# Patient Record
Sex: MALE | Race: WHITE | NOT HISPANIC OR LATINO | Employment: FULL TIME | RURAL
[De-identification: names, ages, dates, MRNs, and addresses within clinical notes are randomized per-mention and may not be internally consistent; named-entity substitution may affect disease eponyms.]

---

## 2018-11-27 ENCOUNTER — OFFICE VISIT (OUTPATIENT)
Dept: FAMILY MEDICINE CLINIC | Facility: CLINIC | Age: 38
End: 2018-11-27

## 2018-11-27 VITALS
OXYGEN SATURATION: 98 % | BODY MASS INDEX: 30.88 KG/M2 | TEMPERATURE: 97.8 F | WEIGHT: 228 LBS | DIASTOLIC BLOOD PRESSURE: 96 MMHG | HEART RATE: 81 BPM | SYSTOLIC BLOOD PRESSURE: 140 MMHG | HEIGHT: 72 IN

## 2018-11-27 DIAGNOSIS — E78.5 HYPERLIPIDEMIA, UNSPECIFIED HYPERLIPIDEMIA TYPE: ICD-10-CM

## 2018-11-27 DIAGNOSIS — R73.01 ELEVATED FASTING GLUCOSE: ICD-10-CM

## 2018-11-27 DIAGNOSIS — Z20.2 EXPOSURE TO STD: ICD-10-CM

## 2018-11-27 DIAGNOSIS — R94.6 ABNORMAL THYROID FUNCTION TEST: ICD-10-CM

## 2018-11-27 DIAGNOSIS — R03.0 ELEVATED BLOOD PRESSURE READING: ICD-10-CM

## 2018-11-27 PROBLEM — Z86.19 HISTORY OF TICK-BORNE DISEASE: Status: ACTIVE | Noted: 2018-11-27

## 2018-11-27 PROBLEM — R79.0 ABNORMAL BLOOD LEVEL OF IRON: Status: ACTIVE | Noted: 2018-11-27

## 2018-11-27 PROBLEM — Z01.89 LABORATORY TEST: Status: ACTIVE | Noted: 2018-11-27

## 2018-11-27 PROBLEM — Z00.00 WELLNESS EXAMINATION: Status: ACTIVE | Noted: 2018-11-27

## 2018-11-27 LAB
ALBUMIN SERPL-MCNC: 4.6 G/DL (ref 3.5–5)
ALBUMIN/GLOB SERPL: 1.5 G/DL (ref 1.1–2.5)
ALP SERPL-CCNC: 59 U/L (ref 24–120)
ALT SERPL-CCNC: 75 U/L (ref 0–54)
AST SERPL-CCNC: 34 U/L (ref 7–45)
BASOPHILS # BLD AUTO: 0.05 10*3/MM3 (ref 0–0.2)
BASOPHILS NFR BLD AUTO: 0.5 % (ref 0–2)
BILIRUB SERPL-MCNC: 0.8 MG/DL (ref 0.1–1)
BUN SERPL-MCNC: 14 MG/DL (ref 5–21)
BUN/CREAT SERPL: 13.1 (ref 7–25)
CALCIUM SERPL-MCNC: 9 MG/DL (ref 8.4–10.4)
CHLORIDE SERPL-SCNC: 98 MMOL/L (ref 98–110)
CHOLEST SERPL-MCNC: 195 MG/DL (ref 130–200)
CO2 SERPL-SCNC: 29 MMOL/L (ref 24–31)
CREAT SERPL-MCNC: 1.07 MG/DL (ref 0.5–1.4)
EOSINOPHIL # BLD AUTO: 0 10*3/MM3 (ref 0–0.7)
EOSINOPHIL NFR BLD AUTO: 0 % (ref 0–4)
ERYTHROCYTE [DISTWIDTH] IN BLOOD BY AUTOMATED COUNT: 13.8 % (ref 12–15)
GLOBULIN SER CALC-MCNC: 3 GM/DL
GLUCOSE SERPL-MCNC: 117 MG/DL (ref 70–100)
HBA1C MFR BLD: 5.4 %
HCT VFR BLD AUTO: 51.6 % (ref 40–52)
HDLC SERPL-MCNC: 44 MG/DL
HGB BLD-MCNC: 17.3 G/DL (ref 14–18)
IMM GRANULOCYTES # BLD: 0.02 10*3/MM3 (ref 0–0.03)
IMM GRANULOCYTES NFR BLD: 0.2 % (ref 0–5)
LDLC SERPL CALC-MCNC: 126 MG/DL (ref 0–99)
LYMPHOCYTES # BLD AUTO: 1.49 10*3/MM3 (ref 0.72–4.86)
LYMPHOCYTES NFR BLD AUTO: 14.5 % (ref 15–45)
MCH RBC QN AUTO: 29 PG (ref 28–32)
MCHC RBC AUTO-ENTMCNC: 33.5 G/DL (ref 33–36)
MCV RBC AUTO: 86.4 FL (ref 82–95)
MONOCYTES # BLD AUTO: 0.65 10*3/MM3 (ref 0.19–1.3)
MONOCYTES NFR BLD AUTO: 6.3 % (ref 4–12)
NEUTROPHILS # BLD AUTO: 8.06 10*3/MM3 (ref 1.87–8.4)
NEUTROPHILS NFR BLD AUTO: 78.5 % (ref 39–78)
NRBC BLD AUTO-RTO: 0 /100 WBC (ref 0–0)
PLATELET # BLD AUTO: 292 10*3/MM3 (ref 130–400)
POTASSIUM SERPL-SCNC: 4.3 MMOL/L (ref 3.5–5.3)
PROT SERPL-MCNC: 7.6 G/DL (ref 6.3–8.7)
RBC # BLD AUTO: 5.97 10*6/MM3 (ref 4.8–5.9)
SODIUM SERPL-SCNC: 140 MMOL/L (ref 135–145)
TRIGL SERPL-MCNC: 125 MG/DL (ref 0–149)
TSH SERPL DL<=0.005 MIU/L-ACNC: 0.49 MIU/ML (ref 0.47–4.68)
VLDLC SERPL CALC-MCNC: 25 MG/DL
WBC # BLD AUTO: 10.27 10*3/MM3 (ref 4.8–10.8)

## 2018-11-27 PROCEDURE — 99214 OFFICE O/P EST MOD 30 MIN: CPT | Performed by: FAMILY MEDICINE

## 2018-11-27 NOTE — PROGRESS NOTES
Subjective   Mode Florez is a 38 y.o. male presenting with chief complaint of:   Chief Complaint   Patient presents with   • Annual Exam     Check-up.       History of Present Illness :  Alone.   Has multiple chronic problems to consider that might have a bearing on today's issues;  an interval appointment.       Chronic/acute problems reviewed today:   1. Elevated blood pressure reading: acute/without pattern and uncertain if has HTN/not.  No chest pain shortness of breath.   2. Abnormal thyroid function test: chronic/stable past elevations and no clinical associations yet. Lab monitored.    3. Elevated fasting glucose: Chronic/stable. No problem/pattern hypoglycemia/hyperglycemia manifest by poly- dypsia, phagia, uria, or sweats, diaphoretic episodes, syncope/near.      4. Hyperlipidemia, unspecified hyperlipidemia type: Chronic/stable.  Tolerated use of statin with labs showing improved lipid values and tolerant liver labs. No muscle aches unexpected.      5. Exposure to STD: chronic/occ ? Of exposure though not certain. No signs/symptoms just likes to have occ labs.       Has an/another acute issue today: none.    The following portions of the patient's history were reviewed and updated as appropriate: allergies, current medications, past family history, past medical history, past social history, past surgical history and problem list.      Current Outpatient Medications:   •  Multiple Vitamins-Minerals (MULTIVITAMIN ADULT PO), Take 1 tablet by mouth Daily., Disp: , Rfl:     No problems with medications.  Refills if needed done    No Known Allergies    Review of Systems  GENERAL:  Active home/work. Sleep is ok; apnea denied. No fever now.  SKIN: No  rash/skin lesion of concern:   ENDO:  No syncope, near or diaphoretic sweaty spells.  HEENT: No recent head injury; or headache,  No vision change.  No hearing loss.  Ears without pain/drainage.  No sore throat.  No significant nasal/sinus congestion/drainage. No  "epistaxis.  CHEST: No chest wall tenderness or mass. No cough,  without wheeze.  No SOB; no hemoptysis.  CV: No exertional chest pain, palpitations, ankle edema.  GI: No heartburn, dysphagia.  No abdominal pain, diarrhea, constipation.  No rectal bleeding, or melena.    :  Voids without dysuria, or  incontinence to completion.  ORTHO: No painful/swollen joints but various on /off sore.  No sore neck or back.  No acute neck or back pain without recent injury.  NEURO: No dizziness, weakness of extremities.  No numbness/paresthesias.   PSYCH: No memory loss.  Mood good; not anxious, depressed but/and not suicidal.  Tries to tolerate stress .   Screening:  Mammogram: NA  Bone density: NA  Low dose CT chest: NA  GI: NA  Prostate: NA  Usual lab order  6m BMP, A1c, iron  12m CBC, CMP, A1c, LIPID, TSH, iron    No results found for this or any previous visit.    No results found for: PSA     Lab Results:  CBC:No results for input(s): WBC, HGB, HCT, PLT, IRONSERUM, IRON in the last 37107 hours.   BMP/CMP:No results for input(s): NA, K, CL, CO2, GLU, BUN, CREATININE, EGFRIFNONA, EGFRIFAFRI, CALCIUM in the last 18062 hours.  HEPATIC:No results for input(s): ALT, AST, ALKPHOS, TOTAL in the last 68220 hours.  THYROID:No results for input(s): TSH, T3FREE, FTI in the last 23744 hours.    Invalid input(s): T4FREE, T3, T4, TEUP,  TOTALT4  A1C:No results for input(s): HGBA1C in the last 19164 hours.  PSA:No results for input(s): PSA in the last 35952 hours.    Objective   /96 (BP Location: Left arm, Patient Position: Sitting)   Pulse 81   Temp 97.8 °F (36.6 °C) (Tympanic)   Ht 182.9 cm (72\")   Wt 103 kg (228 lb)   SpO2 98%   BMI 30.92 kg/m²   Body mass index is 30.92 kg/m².    Physical Exam  GENERAL:  Well nourished/developed in no acute distress.   SKIN: Turgor excellent, without wound, rash, lesion.  HEENT: Normal cephalic without trauma.  Pupils equal round reactive to light. Extraocular motions full without " nystagmus.   External canals nonobstructive nontender without reddness. Tymphatic membranes amber with michelle structures intact.   Oral cavity without growths, exudates, and moist.  Posterior pharynx without mass, obstruction, redness.  No thyromegaly, mass, tenderness, lymphadenopathy and supple.  CV: Regular rhythm.  No murmur, gallop,  edema. Posterior pulses intact.  No carotid bruits.  CHEST: No chest wall tenderness or mass.   LUNGS: Symmetric motion with clear to auscultation.   ABD: Soft, nontender without mass.   ORTHO: Symmetric extremities without swelling/point tenderness.  Full gross range of motion.  NEURO: CN 2-12 grossly intact.  Symmetric facies and UE/LE. 4/5 strength throughout. 1/4 x bicep equal reflexes.  Nonfocal use extremities. Speech clear.  Intact light touch with monofilament, vibratory sensation with tuning fork; equal toes/distal feet.    PSYCH: Oriented x 3.  Pleasant calm, well kept.  Purposeful/directed conservation with intact short/long gross memory.     Assessment/Plan     1. Elevated blood pressure reading    2. Abnormal thyroid function test    3. Elevated fasting glucose    4. Hyperlipidemia, unspecified hyperlipidemia type    5. Exposure to STD        Rx: reviewed/changes:  none    LAB/Testing/Referrals: reviewed/orders:   Today:   Orders Placed This Encounter   Procedures   • Chlamydia trachomatis, Neisseria gonorrhoeae, PCR - Swab, Urine, Clean Catch   • TSH   • Comprehensive Metabolic Panel   • Hemoglobin A1c   • Lipid Panel   • RPR   • HIV-1/O/2 Ag/Ab w Reflex   • CBC & Differential       Discussions:   Safe sex  Body mass index is 30.92 kg/m².   Patient's Body mass index is 30.92 kg/m². BMI is within normal parameters. No follow-up required.  Non-smoker    Patient Instructions   Goal for your blood pressure is 130 range top and 80 range bottom and you feeling better. Use us/or home monitoring to prove this.         Follow up: Return for lab today and lab 6m and   Francisco/lab 12m.  Future Appointments   Date Time Provider Department Center   11/28/2018  9:30 AM [facility] [place] None   5/31/2019 10:00 AM [facility] [place] None   12/2/2019 10:30 AM Yfn Singh MD [facility] None

## 2018-11-28 ENCOUNTER — RESULTS ENCOUNTER (OUTPATIENT)
Dept: FAMILY MEDICINE CLINIC | Facility: CLINIC | Age: 38
End: 2018-11-28

## 2018-11-28 DIAGNOSIS — Z20.2 EXPOSURE TO STD: ICD-10-CM

## 2018-11-28 DIAGNOSIS — Z20.2 EXPOSURE TO STD: Primary | ICD-10-CM

## 2018-11-29 LAB
C TRACH RRNA SPEC QL NAA+PROBE: NEGATIVE
N GONORRHOEA RRNA SPEC QL NAA+PROBE: NEGATIVE

## 2018-12-03 ENCOUNTER — RESULTS ENCOUNTER (OUTPATIENT)
Dept: FAMILY MEDICINE CLINIC | Facility: CLINIC | Age: 38
End: 2018-12-03

## 2018-12-03 DIAGNOSIS — Z20.2 EXPOSURE TO STD: ICD-10-CM

## 2019-06-03 DIAGNOSIS — R73.01 ELEVATED FASTING GLUCOSE: ICD-10-CM

## 2019-06-03 DIAGNOSIS — R94.6 ABNORMAL THYROID FUNCTION TEST: Primary | ICD-10-CM

## 2019-06-04 LAB
BUN SERPL-MCNC: 13 MG/DL (ref 6–20)
BUN/CREAT SERPL: 13.4 (ref 7–25)
CALCIUM SERPL-MCNC: 9.2 MG/DL (ref 8.6–10.5)
CHLORIDE SERPL-SCNC: 102 MMOL/L (ref 98–107)
CO2 SERPL-SCNC: 27.8 MMOL/L (ref 22–29)
CREAT SERPL-MCNC: 0.97 MG/DL (ref 0.76–1.27)
GLUCOSE SERPL-MCNC: 117 MG/DL (ref 65–99)
HBA1C MFR BLD: 5.7 % (ref 4.8–5.6)
IRON SERPL-MCNC: 119 MCG/DL (ref 59–158)
POTASSIUM SERPL-SCNC: 4 MMOL/L (ref 3.5–5.2)
SODIUM SERPL-SCNC: 142 MMOL/L (ref 136–145)

## 2021-08-10 ENCOUNTER — TELEMEDICINE (OUTPATIENT)
Dept: FAMILY MEDICINE CLINIC | Facility: CLINIC | Age: 41
End: 2021-08-10

## 2021-08-10 ENCOUNTER — TELEPHONE (OUTPATIENT)
Dept: FAMILY MEDICINE CLINIC | Facility: CLINIC | Age: 41
End: 2021-08-10

## 2021-08-10 DIAGNOSIS — U07.1 COVID-19: Primary | ICD-10-CM

## 2021-08-10 PROCEDURE — 99213 OFFICE O/P EST LOW 20 MIN: CPT | Performed by: NURSE PRACTITIONER

## 2021-08-10 RX ORDER — MELATONIN
1000 DAILY
Qty: 14 TABLET | Refills: 0 | Status: SHIPPED | OUTPATIENT
Start: 2021-08-10

## 2021-08-10 RX ORDER — FAMOTIDINE 20 MG/1
20 TABLET, FILM COATED ORAL 2 TIMES DAILY
Qty: 28 TABLET | Refills: 0 | Status: SHIPPED | OUTPATIENT
Start: 2021-08-10

## 2021-08-10 RX ORDER — ATORVASTATIN CALCIUM 40 MG/1
40 TABLET, FILM COATED ORAL NIGHTLY
Qty: 14 TABLET | Refills: 0 | Status: SHIPPED | OUTPATIENT
Start: 2021-08-10

## 2021-08-10 RX ORDER — ZINC SULFATE 50(220)MG
220 CAPSULE ORAL
Qty: 14 CAPSULE | Refills: 0 | Status: SHIPPED | OUTPATIENT
Start: 2021-08-10

## 2021-08-10 NOTE — PROGRESS NOTES
Subjective   Chief Complaint:  Evaluation of COVID-19    History of Present Illness:  This 41 y.o. male was seen via telemedicine MyChart video conference today for evaluation of COVID-19.  He reports he is unvaccinated.  He reports he was tested for Covid and was positive as of yesterday.  Reports symptoms starting same day.    No Known Allergies   Current Outpatient Medications on File Prior to Visit   Medication Sig   • Multiple Vitamins-Minerals (MULTIVITAMIN ADULT PO) Take 1 tablet by mouth Daily.     No current facility-administered medications on file prior to visit.      Past Medical, Surgical, Social, and Family History:  History reviewed. No pertinent past medical history.  History reviewed. No pertinent surgical history.  Social History     Socioeconomic History   • Marital status: Single     Spouse name: Not on file   • Number of children: Not on file   • Years of education: Not on file   • Highest education level: Not on file   Tobacco Use   • Smoking status: Never Smoker   • Smokeless tobacco: Never Used   Substance and Sexual Activity   • Alcohol use: No   • Drug use: No   • Sexual activity: Defer     Family History   Problem Relation Age of Onset   • No Known Problems Mother    • Diabetes Father      Objective   Physical Exam  Constitutional:       General: He is not in acute distress.     Appearance: Normal appearance. He is ill-appearing.   Pulmonary:      Effort: Pulmonary effort is normal. No respiratory distress.   Neurological:      Mental Status: He is alert.       Assessment/Plan   Diagnoses and all orders for this visit:    1. COVID-19 (Primary)    Other orders  -     atorvastatin (Lipitor) 40 MG tablet; Take 1 tablet by mouth Every Night.  Dispense: 14 tablet; Refill: 0  -     famotidine (Pepcid) 20 MG tablet; Take 1 tablet by mouth 2 (Two) Times a Day.  Dispense: 28 tablet; Refill: 0  -     cholecalciferol (Vitamin D, Cholecalciferol,) 25 MCG (1000 UT) tablet; Take 1 tablet by mouth Daily.   Dispense: 14 tablet; Refill: 0  -     Ascorbic Acid (Vitamin C) 500 MG chewable tablet; Chew 1,000 mg Daily for 14 days.  Dispense: 28 each; Refill: 0  -     Zinc 220 (50 Zn) MG capsule; Take 1 capsule by mouth Daily With Breakfast.  Dispense: 14 capsule; Refill: 0    Discussion:  Advised and educated plan of care.  Covid cocktail sent, advised quarantine guidelines, advised ED guidelines.    Follow-up:  Return if symptoms worsen or fail to improve.    Electronically signed by REY Lucas, 08/10/21, 3:22 PM CDT.

## 2021-08-10 NOTE — TELEPHONE ENCOUNTER
Caller: Mode Florez    Relationship: Self    Best call back number: [phone]    What medication are you requesting: SOMETHING TO HELP SYMPTOMS.    What are your current symptoms:  COUGH, CHEST CONGESTION, BODY ACHES, ELEVATED TEMPERATURE AND DIZZINESS     How long have you been experiencing symptoms: COUPLE OF DAYS     Have you had these symptoms before:    [x] Yes  [] No    Have you been treated for these symptoms before:   [x] Yes  [] No    If a prescription is needed, what is your preferred pharmacy and phone number: [facility] / [place], [place] - [address] [facility] - [phone] [facility] [phone] [facility]     Additional notes: REQUESTING CALLBACK IF/WHEN SOMETHING IS SENT IN. PATIENT IS COVID-19 POSITIVE.

## 2021-08-10 NOTE — TELEPHONE ENCOUNTER
Please schedule a telehealth appointment this afternoon.    Electronically signed by REY Lucas, 08/10/21, 1:05 PM CDT.

## 2021-08-17 ENCOUNTER — TELEPHONE (OUTPATIENT)
Dept: FAMILY MEDICINE CLINIC | Facility: CLINIC | Age: 41
End: 2021-08-17

## 2021-08-17 DIAGNOSIS — R73.01 ELEVATED FASTING GLUCOSE: Primary | ICD-10-CM

## 2021-08-17 DIAGNOSIS — R94.6 ABNORMAL THYROID FUNCTION TEST: ICD-10-CM

## 2021-08-17 DIAGNOSIS — R79.0 ABNORMAL BLOOD LEVEL OF IRON: ICD-10-CM

## 2021-08-17 DIAGNOSIS — Z86.19 HISTORY OF TICK-BORNE DISEASE: ICD-10-CM

## 2021-08-17 DIAGNOSIS — E78.2 MIXED HYPERLIPIDEMIA: ICD-10-CM

## 2021-08-17 DIAGNOSIS — Z00.00 WELLNESS EXAMINATION: ICD-10-CM

## 2021-08-17 NOTE — TELEPHONE ENCOUNTER
Caller: Mode Florez    Relationship: Self    Best call back number: [phone]    What form or medical record are you requesting:     Workmans Comp Paperwork for COVID    Who is requesting this form or medical record from you:   Employer    How would you like to receive the form or medical records (pick-up, mail, fax):     Email    Timeframe paperwork needed:     ASAP

## 2021-08-18 NOTE — TELEPHONE ENCOUNTER
His employer will have to send us the paperwork.  I do not believe we have anything here for him for this.

## 2021-08-18 NOTE — TELEPHONE ENCOUNTER
Notified pt and will fax or my chart his forms to be filled out and advised is past due for annual fasting labs and stated will scheduled when he is feeling better, orders place

## 2024-04-04 ENCOUNTER — OFFICE VISIT (OUTPATIENT)
Dept: FAMILY MEDICINE CLINIC | Facility: CLINIC | Age: 44
End: 2024-04-04
Payer: COMMERCIAL

## 2024-04-04 VITALS
RESPIRATION RATE: 18 BRPM | HEIGHT: 72 IN | OXYGEN SATURATION: 98 % | TEMPERATURE: 97.1 F | HEART RATE: 79 BPM | BODY MASS INDEX: 31.97 KG/M2 | DIASTOLIC BLOOD PRESSURE: 84 MMHG | SYSTOLIC BLOOD PRESSURE: 126 MMHG | WEIGHT: 236 LBS

## 2024-04-04 DIAGNOSIS — R73.01 ELEVATED FASTING GLUCOSE: ICD-10-CM

## 2024-04-04 DIAGNOSIS — G47.33 OSA (OBSTRUCTIVE SLEEP APNEA): ICD-10-CM

## 2024-04-04 DIAGNOSIS — R94.6 ABNORMAL THYROID FUNCTION TEST: ICD-10-CM

## 2024-04-04 DIAGNOSIS — E78.2 MIXED HYPERLIPIDEMIA: ICD-10-CM

## 2024-04-04 DIAGNOSIS — R79.0 ABNORMAL BLOOD LEVEL OF IRON: ICD-10-CM

## 2024-04-04 NOTE — PROGRESS NOTES
B[facility]”.   Subjective   Mode Florez is a 43 y.o. male presenting with chief complaint of:   Chief Complaint   Patient presents with    Annual Exam     AWV NA  Last Completed Annual Wellness Visit       This patient has no relevant Health Maintenance data.             History of Present Illness :  Alone.   Here for review of chronic problems that includes elevated fasting glucose past and others.  Annual visit also    Has few chronic problems to consider that might have a bearing on today's issues; not an interval appointment.       Chronic/acute problems reviewed today:   1. Elevated fasting glucose Chronic/stable. No problem/pattern hypoglycemia/hyperglycemia manifest by poly- dypsia, phagia, uria, or sweats, diaphoretic episodes, syncope/near.      2. Abnormal thyroid function test chronic/past history abnormal; lost to f/u.  Energy level ok without hair/skin change.    3. Abnormal blood level of iron history of iron abnormal; high past then returned to normal   4. Mixed hyperlipidemia Chronic/stable: Once given Lipitor 40 and has been lost to follow-up   5. BALTAZAR (obstructive sleep apnea) chronic increasing difficulty with being tired during the day not to the point of accident.  Others have noted he quits breathing when he sleeps.     Has an/another acute issue today: none.    The following portions of the patient's history were reviewed and updated as appropriate: allergies, current medications, past family history, past medical history, past social history, past surgical history, and problem list.      Current Outpatient Medications:     Multiple Vitamins-Minerals (MULTIVITAMIN ADULT PO), Take 1 tablet by mouth Daily., Disp: , Rfl:     Zinc 220 (50 Zn) MG capsule, Take 1 capsule by mouth Daily With Breakfast., Disp: 14 capsule, Rfl: 0    No problems with medications.    No Known Allergies    Review of Systems  GENERAL:  Active home/work. Sleep is ok; apnea denied. No fever now.  SKIN: No rash/skin lesion  of concern:   ENDO:  No syncope, near or diaphoretic sweaty spells.  HEENT: No recent head injury; or headache,  No vision change.  No hearing loss.  Ears without pain/drainage.  No sore throat.  No significant nasal/sinus congestion/drainage. No epistaxis.  CHEST: No chest wall tenderness or mass. No cough, without wheeze.  No SOB; no hemoptysis.  CV: No exertional chest pain, palpitations, ankle edema.  GI: No heartburn, dysphagia.  No abdominal pain, diarrhea, constipation.  No rectal bleeding, or melena.    :  Voids without dysuria, or incontinence to completion.  ORTHO: No painful/swollen joints but various on /off sore.  No sore neck or back.  No acute neck or back pain without recent injury.  NEURO: No dizziness, weakness of extremities.  No numbness/paresthesias.   PSYCH: No memory loss.  Mood good; not anxious, depressed but/and not suicidal.  Tries to tolerate stress .   Screening:  Mammogram: NA  Bone density: NA  Low dose CT chest: NA  GI: NA  Prostate: NA  Usual lab order  6m BMP, A1c, iron  12m CBC, CMP, A1c, LIPID, TSH, iron    Copy/paste function used for ROS/exam AND each area of these were reviewed, updated, confirmed and supplemented as needed.  Data reviewed:   Recent admit/ER/MD visits: 11.27.18    1. Elevated blood pressure reading    2. Abnormal thyroid function test    3. Elevated fasting glucose    4. Hyperlipidemia, unspecified hyperlipidemia type    5. Exposure to STD          Rx: reviewed/changes:  none     LAB/Testing/Referrals: reviewed/orders:   Today:       Orders Placed This Encounter   Procedures    Chlamydia trachomatis, Neisseria gonorrhoeae, PCR - Swab, Urine, Clean Catch    TSH    Comprehensive Metabolic Panel    Hemoglobin A1c    Lipid Panel    RPR    HIV-1/O/2 Ag/Ab w Reflex    CBC & Differential         Discussions:   Safe sex  Body mass index is 30.92 kg/m².   Patient's Body mass index is 30.92 kg/m². BMI is within normal parameters. No follow-up  "required.  Non-smoker    Last cardiac testing:   Echo:     Radiology considered:   No radiology results for the last 90 days.    Lab Results:  Results for orders placed or performed in visit on 06/03/19   Basic Metabolic Panel    Specimen: Blood   Result Value Ref Range    Glucose 117 (H) 65 - 99 mg/dL    BUN 13 6 - 20 mg/dL    Creatinine 0.97 0.76 - 1.27 mg/dL    eGFR Non African Am 86 >60 mL/min/1.73    eGFR African Am 104 >60 mL/min/1.73    BUN/Creatinine Ratio 13.4 7.0 - 25.0    Sodium 142 136 - 145 mmol/L    Potassium 4.0 3.5 - 5.2 mmol/L    Chloride 102 98 - 107 mmol/L    Total CO2 27.8 22.0 - 29.0 mmol/L    Calcium 9.2 8.6 - 10.5 mg/dL   Hemoglobin A1c    Specimen: Blood   Result Value Ref Range    Hemoglobin A1C 5.70 (H) 4.80 - 5.60 %   Iron    Specimen: Blood   Result Value Ref Range    Iron 119 59 - 158 mcg/dL       Objective   /84   Pulse 79   Temp 97.1 °F (36.2 °C) (Temporal)   Resp 18   Ht 182.9 cm (72\")   Wt 107 kg (236 lb)   SpO2 98%   BMI 32.01 kg/m²   Body mass index is 32.01 kg/m².    Recent Vitals         11/27/2018 4/4/2024          BP: 140/96 126/84      Pulse: 81 79      Temp: 97.8 °F (36.6 °C) 97.1 °F (36.2 °C)      Weight: 103 kg (228 lb) 107 kg (236 lb)      BMI (Calculated): 30.9 32            Wt Readings from Last 15 Encounters:   04/04/24 1058 107 kg (236 lb)   11/27/18 0931 103 kg (228 lb)       Physical Exam      Assessment & Plan     1. Elevated fasting glucose    2. Abnormal thyroid function test    3. Abnormal blood level of iron    4. Mixed hyperlipidemia    5. BALTAZAR (obstructive sleep apnea)      Data review above:   Discussions/medical decisions/reviews:  BP ok  Other vitals ok  Recent Vitals         11/27/2018 4/4/2024          BP: 140/96 126/84      Pulse: 81 79      Temp: 97.8 °F (36.6 °C) 97.1 °F (36.2 °C)      Weight: 103 kg (228 lb) 107 kg (236 lb)      BMI (Calculated): 30.9 32            GENERAL:  Well nourished/developed in no acute distress.   SKIN: Turgor " excellent, without wound, rash, lesion.  HEENT: Normal cephalic without trauma.  Pupils equal round reactive to light. Extraocular motions full without nystagmus.   External canals nonobstructive nontender without reddness. Tymphatic membranes amber with michelle structures intact.   Oral cavity without growths, exudates, and moist.  Posterior pharynx without mass, obstruction, redness.  No thyromegaly, mass, tenderness, lymphadenopathy and supple.  CV: Regular rhythm.  No murmur, gallop,  edema. Posterior pulses intact.  No carotid bruits.  CHEST: No chest wall tenderness or mass.   LUNGS: Symmetric motion with clear to auscultation.   ABD: Soft, nontender without mass.   ORTHO: Symmetric extremities without swelling/point tenderness.  Full gross range of motion.  NEURO: CN 2-12 grossly intact.  Symmetric facies and UE/LE. 4/5 strength throughout. 1/4 x bicep equal reflexes.  Nonfocal use extremities. Speech clear.    PSYCH: Oriented x 3.  Pleasant calm, well kept.  Purposeful/directed conservation with intact short/long gross memory.     Wellness/or annual done   Screening reviewed/updated   Labs today  Sleep study advised  ? Need hemachromatosis testing-see what iron shows  Found when typing: ? HPV vaccine    Follow up: Return for lab today then will see .  No future appointments.    Data review above:   Rx: reviewed and decisions:   Rx new/changes: none  No orders of the defined types were placed in this encounter.      Orders placed:   LAB/Testing/Referrals: reviewed/orders:   Today:   Orders Placed This Encounter   Procedures    Comprehensive Metabolic Panel    Ferritin    Iron Profile    Hemoglobin A1c    TSH Rfx On Abnormal To Free T4    Home Sleep Study    CBC & Differential     Chronic/recurrent labs above or change to:   Same       There is no immunization history on file for this patient.  We advised/reaffirmed our support/suggestion for staying complete with covid- covid boosters, seasonal flu/yearly and  "any missing vaccine from list we supplied; when cannot be given here we suggest contact with local health department office or pharmacy to review missing/needed vaccines and then bring nursing documentation for these vaccines to this office or call this information in. Shingles became \"free\" 1.1.23 for medicare insurance.    Health maintenance:   Body mass index is 32.01 kg/m².  BMI is >= 30 and <35. (Class 1 Obesity). The following options were offered after discussion;: exercise counseling/recommendations and nutrition counseling/recommendations      Tobacco use reviewed:   Mode Florez  reports that he has never smoked. He has never used smokeless tobacco.    Annual/wellness also done today.  Issues as appropriate discussed as counseling, anticipatory guidance:   Nutrition, physical activity, healthy weight, injury prevention, misuse of tobacco, alcohol and drugs, sexual behavior and STDs, contraception, dental health, mental health, immunizations, screenings as appropriate. As appropriate see AVS.    There are no Patient Instructions on file for this visit.          "

## 2024-04-05 PROBLEM — E11.9 TYPE 2 DIABETES MELLITUS WITHOUT COMPLICATION, WITHOUT LONG-TERM CURRENT USE OF INSULIN: Status: ACTIVE | Noted: 2024-04-05

## 2024-04-05 LAB
ALBUMIN SERPL-MCNC: 4.8 G/DL (ref 3.5–5.2)
ALBUMIN/GLOB SERPL: 2 G/DL
ALP SERPL-CCNC: 68 U/L (ref 39–117)
ALT SERPL-CCNC: 63 U/L (ref 1–41)
AST SERPL-CCNC: 25 U/L (ref 1–40)
BASOPHILS # BLD AUTO: 0.04 10*3/MM3 (ref 0–0.2)
BASOPHILS NFR BLD AUTO: 0.6 % (ref 0–1.5)
BILIRUB SERPL-MCNC: 0.7 MG/DL (ref 0–1.2)
BUN SERPL-MCNC: 13 MG/DL (ref 6–20)
BUN/CREAT SERPL: 11.1 (ref 7–25)
CALCIUM SERPL-MCNC: 8.7 MG/DL (ref 8.6–10.5)
CHLORIDE SERPL-SCNC: 101 MMOL/L (ref 98–107)
CO2 SERPL-SCNC: 28.8 MMOL/L (ref 22–29)
CREAT SERPL-MCNC: 1.17 MG/DL (ref 0.76–1.27)
EGFRCR SERPLBLD CKD-EPI 2021: 79.3 ML/MIN/1.73
EOSINOPHIL # BLD AUTO: 0.12 10*3/MM3 (ref 0–0.4)
EOSINOPHIL NFR BLD AUTO: 1.9 % (ref 0.3–6.2)
ERYTHROCYTE [DISTWIDTH] IN BLOOD BY AUTOMATED COUNT: 14 % (ref 12.3–15.4)
FERRITIN SERPL-MCNC: 715 NG/ML (ref 30–400)
GLOBULIN SER CALC-MCNC: 2.4 GM/DL
GLUCOSE SERPL-MCNC: 128 MG/DL (ref 65–99)
HBA1C MFR BLD: 6.9 % (ref 4.8–5.6)
HCT VFR BLD AUTO: 48.3 % (ref 37.5–51)
HGB BLD-MCNC: 16.4 G/DL (ref 13–17.7)
IMM GRANULOCYTES # BLD AUTO: 0.02 10*3/MM3 (ref 0–0.05)
IMM GRANULOCYTES NFR BLD AUTO: 0.3 % (ref 0–0.5)
IRON SATN MFR SERPL: 30 % (ref 20–50)
IRON SERPL-MCNC: 114 MCG/DL (ref 59–158)
LYMPHOCYTES # BLD AUTO: 2.33 10*3/MM3 (ref 0.7–3.1)
LYMPHOCYTES NFR BLD AUTO: 37.8 % (ref 19.6–45.3)
MCH RBC QN AUTO: 27.7 PG (ref 26.6–33)
MCHC RBC AUTO-ENTMCNC: 34 G/DL (ref 31.5–35.7)
MCV RBC AUTO: 81.5 FL (ref 79–97)
MONOCYTES # BLD AUTO: 0.5 10*3/MM3 (ref 0.1–0.9)
MONOCYTES NFR BLD AUTO: 8.1 % (ref 5–12)
NEUTROPHILS # BLD AUTO: 3.15 10*3/MM3 (ref 1.7–7)
NEUTROPHILS NFR BLD AUTO: 51.3 % (ref 42.7–76)
NRBC BLD AUTO-RTO: 0 /100 WBC (ref 0–0.2)
PLATELET # BLD AUTO: 299 10*3/MM3 (ref 140–450)
POTASSIUM SERPL-SCNC: 4 MMOL/L (ref 3.5–5.2)
PROT SERPL-MCNC: 7.2 G/DL (ref 6–8.5)
RBC # BLD AUTO: 5.93 10*6/MM3 (ref 4.14–5.8)
SODIUM SERPL-SCNC: 140 MMOL/L (ref 136–145)
TIBC SERPL-MCNC: 375 MCG/DL
TSH SERPL DL<=0.005 MIU/L-ACNC: 0.9 UIU/ML (ref 0.27–4.2)
UIBC SERPL-MCNC: 261 MCG/DL (ref 112–346)
WBC # BLD AUTO: 6.16 10*3/MM3 (ref 3.4–10.8)

## 2024-04-08 RX ORDER — METFORMIN HYDROCHLORIDE 500 MG/1
500 TABLET, EXTENDED RELEASE ORAL NIGHTLY
Qty: 30 TABLET | Refills: 0 | Status: SHIPPED | OUTPATIENT
Start: 2024-04-08

## 2024-04-08 NOTE — TELEPHONE ENCOUNTER
----- Message from Bravo Ricardo MA sent at 4/8/2024 11:09 AM CDT -----  Would like medication be sent to [facility] in [place]

## 2024-04-08 NOTE — TELEPHONE ENCOUNTER
Start metformin 500 XR one HS  Return 1m-will discuss more and get him meter/test strips/etc    Go ahead and write/make apt  Send to me for approval

## 2024-05-07 ENCOUNTER — HOSPITAL ENCOUNTER (OUTPATIENT)
Dept: SLEEP CENTER | Age: 44
Discharge: HOME | End: 2024-05-09

## 2024-05-07 ENCOUNTER — OFFICE VISIT (OUTPATIENT)
Dept: FAMILY MEDICINE CLINIC | Facility: CLINIC | Age: 44
End: 2024-05-07
Payer: COMMERCIAL

## 2024-05-07 VITALS
TEMPERATURE: 97.3 F | OXYGEN SATURATION: 97 % | BODY MASS INDEX: 32.1 KG/M2 | SYSTOLIC BLOOD PRESSURE: 140 MMHG | HEIGHT: 72 IN | HEART RATE: 81 BPM | DIASTOLIC BLOOD PRESSURE: 84 MMHG | WEIGHT: 237 LBS

## 2024-05-07 DIAGNOSIS — R74.8 ELEVATED LIVER ENZYMES: ICD-10-CM

## 2024-05-07 DIAGNOSIS — Z11.59 NEED FOR HEPATITIS C SCREENING TEST: ICD-10-CM

## 2024-05-07 DIAGNOSIS — E11.9 TYPE 2 DIABETES MELLITUS WITHOUT COMPLICATION, WITHOUT LONG-TERM CURRENT USE OF INSULIN: ICD-10-CM

## 2024-05-07 DIAGNOSIS — G47.33 OSA (OBSTRUCTIVE SLEEP APNEA): ICD-10-CM

## 2024-05-07 DIAGNOSIS — B35.1 FUNGAL TOENAIL INFECTION: ICD-10-CM

## 2024-05-07 PROCEDURE — 99214 OFFICE O/P EST MOD 30 MIN: CPT | Performed by: FAMILY MEDICINE

## 2024-05-07 RX ORDER — EFINACONAZOLE 100 MG/ML
1 SOLUTION TOPICAL DAILY
Qty: 4 ML | Refills: 5 | Status: SHIPPED | OUTPATIENT
Start: 2024-05-07

## 2024-05-07 RX ORDER — BLOOD-GLUCOSE METER
1 EACH MISCELLANEOUS TAKE AS DIRECTED
Qty: 1 EACH | Refills: 0 | Status: SHIPPED | OUTPATIENT
Start: 2024-05-07

## 2024-05-07 RX ORDER — LANCETS 30 GAUGE
EACH MISCELLANEOUS
Qty: 100 EACH | Refills: 12 | Status: SHIPPED | OUTPATIENT
Start: 2024-05-07

## 2024-05-07 ASSESSMENT — SLEEP AND FATIGUE QUESTIONNAIRES
HOW LIKELY ARE YOU TO NOD OFF OR FALL ASLEEP WHILE SITTING AND READING: HIGH CHANCE OF DOZING
HOW LIKELY ARE YOU TO NOD OFF OR FALL ASLEEP WHILE LYING DOWN TO REST IN THE AFTERNOON WHEN CIRCUMSTANCES PERMIT: HIGH CHANCE OF DOZING
ESS TOTAL SCORE: 14
HOW LIKELY ARE YOU TO NOD OFF OR FALL ASLEEP WHILE SITTING QUIETLY AFTER LUNCH WITHOUT ALCOHOL: MODERATE CHANCE OF DOZING
HOW LIKELY ARE YOU TO NOD OFF OR FALL ASLEEP WHILE SITTING INACTIVE IN A PUBLIC PLACE: MODERATE CHANCE OF DOZING
HOW LIKELY ARE YOU TO NOD OFF OR FALL ASLEEP IN A CAR, WHILE STOPPED FOR A FEW MINUTES IN TRAFFIC: WOULD NEVER DOZE
HOW LIKELY ARE YOU TO NOD OFF OR FALL ASLEEP WHEN YOU ARE A PASSENGER IN A CAR FOR AN HOUR WITHOUT A BREAK: SLIGHT CHANCE OF DOZING
HOW LIKELY ARE YOU TO NOD OFF OR FALL ASLEEP WHILE WATCHING TV: HIGH CHANCE OF DOZING
HOW LIKELY ARE YOU TO NOD OFF OR FALL ASLEEP WHILE SITTING AND TALKING TO SOMEONE: WOULD NEVER DOZE

## 2024-05-07 NOTE — PROGRESS NOTES
Mr. Edilson Reyes presented today in the sleep center for a Home Sleep Test (HST).  Mr. Reyes was instructed on the device and was requested to wear the unit for two nights. Mr. Reyes was asked to have the HST monitor back by 10AM on  05/10/2024. The patient acknowledged he understood. The HST device was tested and was in working order.

## 2024-05-16 DIAGNOSIS — G47.33 OSA (OBSTRUCTIVE SLEEP APNEA): Primary | ICD-10-CM

## 2024-05-16 LAB — HBA1C MFR BLD: 6.7 % (ref 4.8–5.6)

## 2024-05-17 ENCOUNTER — TELEPHONE (OUTPATIENT)
Dept: FAMILY MEDICINE CLINIC | Facility: CLINIC | Age: 44
End: 2024-05-17

## 2024-05-17 ENCOUNTER — TELEPHONE (OUTPATIENT)
Dept: FAMILY MEDICINE CLINIC | Facility: CLINIC | Age: 44
End: 2024-05-17
Payer: COMMERCIAL

## 2024-05-17 ENCOUNTER — TELEPHONE (OUTPATIENT)
Dept: SLEEP CENTER | Age: 44
End: 2024-05-17

## 2024-05-17 NOTE — TELEPHONE ENCOUNTER
Left voicemail with Mr. Edilson Reyes about his HST performed  05/08/2024 and 05/10/2024.  The HST on 05/08/2024 revealed an AHI of 22.4  and the study on 05/10/2024 revealed an AHI of 24.7.  An AHI between 15 and 29.9 is considered to be within the moderate range.    The interpreting physician wrote orders for an APAP with a minimum pressure of 8cm/H2O and a maximum pressure of 20cm/H2O.  Orders, documentation and insurance information were sent to [employer] [place].  Results were routed to the ordering provider, Merritt Phoenix M.D.

## 2024-05-17 NOTE — TELEPHONE ENCOUNTER
Hub staff attempted to follow warm transfer process and was unsuccessful     Caller: Mode Florez    Relationship to patient: Self    Best call back number:     [phone] (Home)     Patient is needing: The patient states that he is returning a phone call.

## 2024-05-20 DIAGNOSIS — G47.33 OSA (OBSTRUCTIVE SLEEP APNEA): Primary | ICD-10-CM

## 2024-05-21 RX ORDER — METFORMIN HYDROCHLORIDE 500 MG/1
500 TABLET, EXTENDED RELEASE ORAL NIGHTLY
Qty: 90 TABLET | Refills: 3 | Status: SHIPPED | OUTPATIENT
Start: 2024-05-21

## 2024-10-09 ENCOUNTER — OFFICE VISIT (OUTPATIENT)
Dept: FAMILY MEDICINE CLINIC | Facility: CLINIC | Age: 44
End: 2024-10-09
Payer: COMMERCIAL

## 2024-10-09 VITALS
WEIGHT: 246.2 LBS | HEIGHT: 72 IN | OXYGEN SATURATION: 97 % | DIASTOLIC BLOOD PRESSURE: 92 MMHG | SYSTOLIC BLOOD PRESSURE: 148 MMHG | HEART RATE: 84 BPM | BODY MASS INDEX: 33.35 KG/M2

## 2024-10-09 DIAGNOSIS — E11.9 TYPE 2 DIABETES MELLITUS WITHOUT COMPLICATION, WITHOUT LONG-TERM CURRENT USE OF INSULIN: ICD-10-CM

## 2024-10-09 DIAGNOSIS — E78.2 MIXED HYPERLIPIDEMIA: ICD-10-CM

## 2024-10-09 DIAGNOSIS — M54.2 NECK PAIN: ICD-10-CM

## 2024-10-09 DIAGNOSIS — I10 PRIMARY HYPERTENSION: Primary | ICD-10-CM

## 2024-10-09 PROBLEM — R73.01 ELEVATED FASTING GLUCOSE: Status: RESOLVED | Noted: 2018-11-27 | Resolved: 2024-10-09

## 2024-10-09 PROBLEM — Z00.00 WELLNESS EXAMINATION: Status: RESOLVED | Noted: 2018-11-27 | Resolved: 2024-10-09

## 2024-10-09 PROBLEM — R94.6 ABNORMAL THYROID FUNCTION TEST: Status: RESOLVED | Noted: 2018-11-27 | Resolved: 2024-10-09

## 2024-10-09 PROBLEM — Z01.89 LABORATORY TEST: Status: RESOLVED | Noted: 2018-11-27 | Resolved: 2024-10-09

## 2024-10-09 PROCEDURE — 99214 OFFICE O/P EST MOD 30 MIN: CPT

## 2024-10-09 RX ORDER — LOSARTAN POTASSIUM 25 MG/1
25 TABLET ORAL DAILY
Qty: 30 TABLET | Refills: 1 | Status: SHIPPED | OUTPATIENT
Start: 2024-10-09

## 2024-10-09 RX ORDER — ATORVASTATIN CALCIUM 10 MG/1
10 TABLET, FILM COATED ORAL DAILY
Qty: 90 TABLET | Refills: 1 | Status: SHIPPED | OUTPATIENT
Start: 2024-10-09

## 2024-10-09 NOTE — PROGRESS NOTES
"Chief Complaint  Hypertension (Elevated BP during DOT physical, was not cleared.), Hyperlipidemia, and Diabetes    Subjective      Mode Florez presents to [facility]  History of Present Illness  The patient is a 44-year-old male who presents for evaluation of multiple medical concerns.    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 The initial reading was 167/96, followed by 186/109 on the left arm, and finally 156/105. His systolic blood pressure today is 146. He is not currently taking any medication for blood pressure control. He admits to consuming a high-sodium diet, often eating fast food at work, and acknowledges a lack of physical activity compared to his past habits.    He has been on metformin since May 2024.    He is not currently taking any medication for cholesterol management.    He reports experiencing stiffness in his neck, which restricts his ability to turn it as freely as before. This issue began in February 2024. He wears a vest at work, which he feels is exerting a downward pull on his neck. He has not taken any over-the-counter medication for this issue.    ALLERGIES  The patient has no known allergies.      Objective   Vital Signs:  /92   Pulse 84   Ht 182.9 cm (72\")   Wt 112 kg (246 lb 3.2 oz)   SpO2 97%   BMI 33.39 kg/m²   Estimated body mass index is 33.39 kg/m² as calculated from the following:    Height as of this encounter: 182.9 cm (72\").    Weight as of this encounter: 112 kg (246 lb 3.2 oz).            Physical Exam     Physical Exam        Result Review :  The following labs/imaging/notes were reviewed and discussed with the patient by Prasanna Cardona MD on 10/09/2024:   Latest Reference Range & Units 04/04/24 10:34   Sodium 136 - 145 mmol/L 140   Potassium 3.5 - 5.2 mmol/L 4.0   Chloride 98 - 107 mmol/L 101   CO2 22.0 - 29.0 mmol/L " 28.8   BUN 6 - 20 mg/dL 13   Creatinine 0.76 - 1.27 mg/dL 1.17   BUN/Creatinine Ratio 7.0 - 25.0  11.1   EGFR Result >60.0 mL/min/1.73 79.3   Glucose 65 - 99 mg/dL 128 (H)   Calcium 8.6 - 10.5 mg/dL 8.7   Alkaline Phosphatase 39 - 117 U/L 68   Total Protein 6.0 - 8.5 g/dL 7.2   Albumin 3.5 - 5.2 g/dL 4.8   A/G Ratio g/dL 2.0   AST (SGOT) 1 - 40 U/L 25   ALT (SGPT) 1 - 41 U/L 63 (H)   Total Bilirubin 0.0 - 1.2 mg/dL 0.7   (H): Data is abnormally high   Latest Reference Range & Units 04/04/24 10:34 05/16/24 07:49   Hemoglobin A1C 4.80 - 5.60 % 6.90 (H) 6.70 (H)   (H): Data is abnormally high       Latest Reference Range & Units 04/04/24 10:34   WBC 3.40 - 10.80 10*3/mm3 6.16   RBC 4.14 - 5.80 10*6/mm3 5.93 (H)   Hemoglobin 13.0 - 17.7 g/dL 16.4   Hematocrit 37.5 - 51.0 % 48.3   Platelets 140 - 450 10*3/mm3 299   RDW 12.3 - 15.4 % 14.0   MCV 79.0 - 97.0 fL 81.5   MCH 26.6 - 33.0 pg 27.7   MCHC 31.5 - 35.7 g/dL 34.0   (H): Data is abnormally high     Latest Reference Range & Units 11/27/18 09:28   Total Cholesterol 130 - 200 mg/dL 195   HDL Cholesterol >=40 mg/dL 44   LDL Cholesterol  0 - 99 mg/dL 126 (H)   VLDL Cholesterol mg/dL 25   Triglycerides 0 - 149 mg/dL 125   (H): Data is abnormally high  Assessment      Diagnoses and all orders for this visit:    1. Primary hypertension (Primary)  -     Comprehensive Metabolic Panel    2. Type 2 diabetes mellitus without complication, without long-term current use of insulin  -     Hemoglobin A1c  -     Microalbumin / Creatinine Urine Ratio - Urine, Clean Catch    3. Mixed hyperlipidemia  -     Lipid Panel    4. Neck pain    Other orders  -     losartan (Cozaar) 25 MG tablet; Take 1 tablet by mouth Daily.  Dispense: 30 tablet; Refill: 1  -     atorvastatin (LIPITOR) 10 MG tablet; Take 1 tablet by mouth Daily.  Dispense: 90 tablet; Refill: 1             Assessment & Plan  1. Hypertension.  His blood pressure readings are consistently above the desired range. Losartan 25 mg  daily has been prescribed. A Comprehensive Metabolic Panel (CMP) will be conducted today and another one in 6 weeks to monitor kidney functionof the medication. Ambulitory bp monitoring recommended.     2. Type 2 Diabetes Mellitus.  He is currently on metformin 500 mg nightly. An A1c test will be conducted today to assess blood sugar control. Microalbumin levels will also be checked to ensure kidney health. If the A1c results are abnormal, adjustments to the metformin dosage may be considered.    3. Hypercholesterolemia.  A lipid panel will be conducted today. Atorvastatin 10 mg, to be taken once at night, has been prescribed. He has been advised to monitor for potential muscle cramps, a common side effect of statins.    4. Neck Strain.  Over-the-counter Voltaren gel has been recommended for application on the trapezius muscles to alleviate pain and inflammation. If the condition worsens or becomes particularly bothersome, a referral to physical therapy for stretching exercises may be considered.    Follow-up  Return in 6 weeks for follow-up.    Orders Placed This Encounter   Procedures    Hemoglobin A1c     Order Specific Question:   Release to patient     Answer:   Routine Release [[id number]]    Microalbumin / Creatinine Urine Ratio - Urine, Clean Catch     Order Specific Question:   Release to patient     Answer:   Routine Release [[id number]]    Lipid Panel     Order Specific Question:   Release to patient     Answer:   Routine Release [[id number]]    Comprehensive Metabolic Panel     Order Specific Question:   Release to patient     Answer:   Routine Release [[id number]]       Follow Up   Return in about 6 weeks (around 11/20/2024) for HTN w/ new meds, HLD w/ new meds and diabeteis. .  Patient was given instructions and counseling regarding his condition or for health maintenance advice. Please see specific information pulled into the AVS if appropriate.         Patient or patient representative verbalized  consent for the use of Ambient Listening during the visit with  Prasanna Cardona MD for chart documentation. 10/9/2024  19:44 CDT

## 2024-10-24 ENCOUNTER — OFFICE VISIT (OUTPATIENT)
Dept: FAMILY MEDICINE CLINIC | Facility: CLINIC | Age: 44
End: 2024-10-24
Payer: COMMERCIAL

## 2024-10-24 VITALS
DIASTOLIC BLOOD PRESSURE: 102 MMHG | SYSTOLIC BLOOD PRESSURE: 150 MMHG | OXYGEN SATURATION: 95 % | WEIGHT: 247 LBS | HEIGHT: 72 IN | HEART RATE: 85 BPM | BODY MASS INDEX: 33.46 KG/M2

## 2024-10-24 DIAGNOSIS — E11.9 TYPE 2 DIABETES MELLITUS WITHOUT COMPLICATION, WITHOUT LONG-TERM CURRENT USE OF INSULIN: ICD-10-CM

## 2024-10-24 DIAGNOSIS — I10 PRIMARY HYPERTENSION: Primary | ICD-10-CM

## 2024-10-24 DIAGNOSIS — B35.1 FUNGAL TOENAIL INFECTION: ICD-10-CM

## 2024-10-24 DIAGNOSIS — E78.2 MIXED HYPERLIPIDEMIA: ICD-10-CM

## 2024-10-24 PROCEDURE — 99214 OFFICE O/P EST MOD 30 MIN: CPT

## 2024-10-24 RX ORDER — LOSARTAN POTASSIUM 50 MG/1
50 TABLET ORAL DAILY
Qty: 30 TABLET | Refills: 1 | Status: SHIPPED | OUTPATIENT
Start: 2024-10-24

## 2024-10-24 RX ORDER — EFINACONAZOLE 100 MG/ML
1 SOLUTION TOPICAL DAILY
Qty: 4 ML | Refills: 5 | Status: SHIPPED | OUTPATIENT
Start: 2024-10-24

## 2024-10-24 NOTE — PATIENT INSTRUCTIONS
- Losartan 50 mg daily (Hypertension)  - Metformin 500 mg daily (diabetes)  -Atorvastatin 10 mg daily(cholesterol)      DO NOT take more than the prescribed amount.

## 2024-10-24 NOTE — PROGRESS NOTES
"Chief Complaint  Hypertension (Pt presents today with elevated BP.  Pt needs DOT clearance.)    Subjective      Mode Florez presents to [facility]  History of Present Illness  The patient is a 44-year-old male who is here today for follow-up.    He reports that his blood pressure needs to be below 140/90 for his neighbor to approve something. He was prescribed losartan 25 mg daily on 10/09/2024, which he started taking a day or two later. Despite this, his blood pressure remains high at 150/102. He also mentions that he took three tablets of losartan the night before and three the following morning in an attempt to lower his blood pressure. He has been monitoring his blood pressure at home, which was 165/95 last night.    He is not experiencing any headaches, shortness of breath, or chest pain.Reports lost weight. Has been jogging.       Objective   Vital Signs:  BP (!) 150/102   Pulse 85   Ht 182.9 cm (72\")   Wt 112 kg (247 lb)   SpO2 95%   BMI 33.50 kg/m²   Estimated body mass index is 33.5 kg/m² as calculated from the following:    Height as of this encounter: 182.9 cm (72\").    Weight as of this encounter: 112 kg (247 lb).            Physical Exam     Physical Exam        Result Review :  The following labs/imaging/notes were reviewed and discussed with the patient by Prasanna Cardona MD on 10/24/2024:            Assessment      Diagnoses and all orders for this visit:    1. Primary hypertension (Primary)    2. Mixed hyperlipidemia    3. Type 2 diabetes mellitus without complication, without long-term current use of insulin    4. Fungal toenail infection  -     Efinaconazole (Jublia) 10 % solution; Apply 1 Application topically Daily.  Dispense: 4 mL; Refill: 5    Other orders  -     losartan (Cozaar) 50 MG tablet; Take 1 tablet by mouth Daily.  Dispense: 30 tablet; Refill: 1             Assessment & Plan  1. Hypertension.  His blood pressure readings have been consistently " high, with a reading of 150/102 today and 148/92 during his last visit. The dosage of losartan will be increased to 50 mg daily. He is advised to take two 25 mg tablets if he has them, but to take them at one time. He is instructed to monitor his blood pressure 3 to 4 times a week when he is relaxed and to report the readings in a couple of weeks. The potential risks of taking multiple doses at once and the importance of consistent daily dosing were discussed.    2. Diabetes Mellitus.  He is advised to continue his metformin 500 mg regimen for diabetes management.    3. Hypercholesterolemia.  He is advised to continue his atorvastatin 10 mg regimen for cholesterol management.     Fungal toenail infection  - refilled Jublia    Follow-up  Return in November for follow up.    No orders of the defined types were placed in this encounter.      Follow Up   No follow-ups on file.  Patient was given instructions and counseling regarding his condition or for health maintenance advice. Please see specific information pulled into the AVS if appropriate.         Patient or patient representative verbalized consent for the use of Ambient Listening during the visit with  Prasanna Cardona MD for chart documentation. 10/26/2024  17:32 CDT

## 2024-10-25 LAB
ALBUMIN SERPL-MCNC: 4.4 G/DL (ref 3.5–5.2)
ALBUMIN/CREAT UR: 28 MG/G CREAT (ref 0–29)
ALBUMIN/GLOB SERPL: 1.7 G/DL
ALP SERPL-CCNC: 64 U/L (ref 39–117)
ALT SERPL-CCNC: 70 U/L (ref 1–41)
AST SERPL-CCNC: 37 U/L (ref 1–40)
BILIRUB SERPL-MCNC: 0.7 MG/DL (ref 0–1.2)
BUN SERPL-MCNC: 14 MG/DL (ref 6–20)
BUN/CREAT SERPL: 12.7 (ref 7–25)
CALCIUM SERPL-MCNC: 8.9 MG/DL (ref 8.6–10.5)
CHLORIDE SERPL-SCNC: 100 MMOL/L (ref 98–107)
CHOLEST SERPL-MCNC: 118 MG/DL (ref 0–200)
CO2 SERPL-SCNC: 28.8 MMOL/L (ref 22–29)
CREAT SERPL-MCNC: 1.1 MG/DL (ref 0.76–1.27)
CREAT UR-MCNC: 173 MG/DL
EGFRCR SERPLBLD CKD-EPI 2021: 84.9 ML/MIN/1.73
GLOBULIN SER CALC-MCNC: 2.6 GM/DL
GLUCOSE SERPL-MCNC: 159 MG/DL (ref 65–99)
HBA1C MFR BLD: 7.4 % (ref 4.8–5.6)
HDLC SERPL-MCNC: 44 MG/DL (ref 40–60)
LDLC SERPL CALC-MCNC: 58 MG/DL (ref 0–100)
MICROALBUMIN UR-MCNC: 47.9 UG/ML
POTASSIUM SERPL-SCNC: 4.1 MMOL/L (ref 3.5–5.2)
PROT SERPL-MCNC: 7 G/DL (ref 6–8.5)
SODIUM SERPL-SCNC: 140 MMOL/L (ref 136–145)
TRIGL SERPL-MCNC: 83 MG/DL (ref 0–150)
VLDLC SERPL CALC-MCNC: 16 MG/DL (ref 5–40)

## 2024-11-03 RX ORDER — METFORMIN HYDROCHLORIDE 500 MG/1
500 TABLET, EXTENDED RELEASE ORAL EVERY 12 HOURS
Qty: 90 TABLET | Refills: 3 | Status: SHIPPED | OUTPATIENT
Start: 2024-11-03

## 2024-11-04 ENCOUNTER — TELEPHONE (OUTPATIENT)
Dept: FAMILY MEDICINE CLINIC | Facility: CLINIC | Age: 44
End: 2024-11-04

## 2024-11-04 NOTE — TELEPHONE ENCOUNTER
Caller: Mode Florez    Relationship to patient: Self    Best call back number:     [phone]     Patient is needing: HE STATES HE HAS MISSED A CALL FROM DR GUZMAN AND HE IS RETURNING THE CALL NO OTHER DETAILS WERE GIVEN       HE STATES HE IS ALSO REQUESTING AN ORDER FOR AN MRI OF HIS NECK FOR HIS BONE SPURS

## 2024-11-06 NOTE — TELEPHONE ENCOUNTER
I do not believe that we discussed an MRI of his neck at his last appointment. I See that he mentioned pain and discomfort in his neck at his appointment with me on 10/9, but I don't see anything about bone spurs. No imaging of neck either. PT was mentioned. Patient has appointment with me on the 20th, we can discuss this then.

## 2024-11-20 ENCOUNTER — OFFICE VISIT (OUTPATIENT)
Dept: FAMILY MEDICINE CLINIC | Facility: CLINIC | Age: 44
End: 2024-11-20
Payer: COMMERCIAL

## 2024-11-20 VITALS
HEIGHT: 72 IN | OXYGEN SATURATION: 98 % | WEIGHT: 239 LBS | BODY MASS INDEX: 32.37 KG/M2 | SYSTOLIC BLOOD PRESSURE: 142 MMHG | HEART RATE: 78 BPM | DIASTOLIC BLOOD PRESSURE: 100 MMHG

## 2024-11-20 DIAGNOSIS — I10 PRIMARY HYPERTENSION: Primary | ICD-10-CM

## 2024-11-20 DIAGNOSIS — M54.2 NECK PAIN: ICD-10-CM

## 2024-11-20 DIAGNOSIS — E11.9 TYPE 2 DIABETES MELLITUS WITHOUT COMPLICATION, WITHOUT LONG-TERM CURRENT USE OF INSULIN: ICD-10-CM

## 2024-11-20 PROCEDURE — 99214 OFFICE O/P EST MOD 30 MIN: CPT

## 2024-11-20 RX ORDER — LOSARTAN POTASSIUM AND HYDROCHLOROTHIAZIDE 12.5; 5 MG/1; MG/1
1 TABLET ORAL DAILY
Qty: 90 TABLET | Refills: 1 | Status: SHIPPED | OUTPATIENT
Start: 2024-11-20

## 2024-11-20 NOTE — PROGRESS NOTES
"Chief Complaint  Hypertension, Diabetes (A1C 7.4 ON 10/24/2024), and Hyperlipidemia    Subjective      Mode Florez presents to [facility]  History of Present Illness  The patient is a 44-year-old male here for a follow-up visit.    He was last seen on 10/24/2023, when his losartan dosage was increased to 50 mg daily due to elevated blood pressures. His metformin dosage was also increased on 11/03/2023 to 500 mg twice daily. He has been taking metformin twice daily, once in the morning and once in the afternoon, without experiencing any side effects. Despite being on losartan 50 mg for about a month, his home blood pressure readings remain high, around 140s over 100. This persistent hypertension has led to his second job not approving him due to this medical condition. He has no history of gout. He has been making an effort to jog more frequently, particularly in the mornings, and has reduced his consumption of sugary sodas.    He has undergone x-rays of his neck and is seeking a review of these images to determine if further imaging, such as an MRI, is necessary. He has been seeing a chiropractor who identified minor bone spurs and early fusion in some of his discs. After a chiropractic adjustment, he experienced significant relief. He has been experiencing sharp pain when looking down and a reduction in his range of motion by about 40 percent. However, his condition has improved with chiropractic treatment, which he receives three times a week.      Objective   Vital Signs:  /100   Pulse 78   Ht 182.9 cm (72\")   Wt 108 kg (239 lb)   SpO2 98%   BMI 32.41 kg/m²   Estimated body mass index is 32.41 kg/m² as calculated from the following:    Height as of this encounter: 182.9 cm (72\").    Weight as of this encounter: 108 kg (239 lb).            Physical Exam     Physical Exam        Result Review :  The following labs/imaging/notes were reviewed and discussed with the " patient by Prasanna Cardona MD on 11/20/2024:   Latest Reference Range & Units 10/24/24 08:34   Sodium 136 - 145 mmol/L 140   Potassium 3.5 - 5.2 mmol/L 4.1   Chloride 98 - 107 mmol/L 100   CO2 22.0 - 29.0 mmol/L 28.8   BUN 6 - 20 mg/dL 14   Creatinine 0.76 - 1.27 mg/dL 1.10   BUN/Creatinine Ratio 7.0 - 25.0  12.7   EGFR Result >60.0 mL/min/1.73 84.9   Glucose 65 - 99 mg/dL 159 (H)   Calcium 8.6 - 10.5 mg/dL 8.9   Alkaline Phosphatase 39 - 117 U/L 64   Total Protein 6.0 - 8.5 g/dL 7.0   Albumin 3.5 - 5.2 g/dL 4.4   A/G Ratio g/dL 1.7   AST (SGOT) 1 - 40 U/L 37   ALT (SGPT) 1 - 41 U/L 70 (H)   Total Bilirubin 0.0 - 1.2 mg/dL 0.7   Hemoglobin A1C 4.80 - 5.60 % 7.40 (H)   (H): Data is abnormally high     Latest Reference Range & Units 10/24/24 08:34   Total Cholesterol 0 - 200 mg/dL 118   HDL Cholesterol 40 - 60 mg/dL 44   LDL Cholesterol  0 - 100 mg/dL 58   Triglycerides 0 - 150 mg/dL 83   VLDL Cholesterol Bharathi 5 - 40 mg/dL 16           Assessment      Diagnoses and all orders for this visit:    1. Primary hypertension (Primary)    2. Type 2 diabetes mellitus without complication, without long-term current use of insulin    3. Neck pain             Assessment & Plan  1. Hypertension.  His blood pressure remains elevated at approximately 140/100 despite the current dosage of losartan 50 mg. The dosage of losartan will be increased to 100 mg. He is advised to discontinue the current losartan 50 mg and commence Hyzaar 50 mg/12.5 mg. If his blood pressure remains elevated, we will resume the losartan 50 mg, resulting in a total of 100 mg of losartan and 12.5 mg of HCTZ. He is encouraged to continue jogging and reducing sugar intake to help manage his blood pressure.    2. Diabetes Mellitus.  His A1c level was recorded as 7.4 in October 2023. The dosage of metformin was increased to 500 mg twice daily. It is too early to assess the effectiveness of the increased dosage. He is advised to continue with the current regimen and  increase physical activity to help manage his blood sugar levels.    3. Neck pain.  The x-ray  do not indicate any narrowing of the joint spaces, dislocation, or acute fractures. He is advised to continue with his chiropractic treatment. If this proves ineffective, physical therapy will be considered. An MRI is not deemed necessary at this point unless nerve symptoms develop.    Follow-up  Return in 6 weeks for follow up.    No orders of the defined types were placed in this encounter.      Follow Up   No follow-ups on file.  Patient was given instructions and counseling regarding his condition or for health maintenance advice. Please see specific information pulled into the AVS if appropriate.         Patient or patient representative verbalized consent for the use of Ambient Listening during the visit with  Prasanna Cardona MD for chart documentation. 11/20/2024  21:11 CST

## 2024-12-18 ENCOUNTER — OFFICE VISIT (OUTPATIENT)
Dept: FAMILY MEDICINE CLINIC | Facility: CLINIC | Age: 44
End: 2024-12-18
Payer: COMMERCIAL

## 2024-12-18 VITALS
OXYGEN SATURATION: 98 % | DIASTOLIC BLOOD PRESSURE: 86 MMHG | BODY MASS INDEX: 32.23 KG/M2 | SYSTOLIC BLOOD PRESSURE: 148 MMHG | WEIGHT: 238 LBS | HEART RATE: 78 BPM | TEMPERATURE: 98.3 F | RESPIRATION RATE: 16 BRPM | HEIGHT: 72 IN

## 2024-12-18 DIAGNOSIS — E78.2 MIXED HYPERLIPIDEMIA: ICD-10-CM

## 2024-12-18 DIAGNOSIS — I10 PRIMARY HYPERTENSION: Primary | ICD-10-CM

## 2024-12-18 DIAGNOSIS — E11.9 TYPE 2 DIABETES MELLITUS WITHOUT COMPLICATION, WITHOUT LONG-TERM CURRENT USE OF INSULIN: ICD-10-CM

## 2024-12-18 PROCEDURE — 99214 OFFICE O/P EST MOD 30 MIN: CPT

## 2024-12-18 RX ORDER — LOSARTAN POTASSIUM 100 MG/1
100 TABLET ORAL DAILY
Qty: 90 TABLET | Refills: 1 | Status: SHIPPED | OUTPATIENT
Start: 2024-12-18

## 2024-12-18 RX ORDER — AMLODIPINE BESYLATE 5 MG/1
5 TABLET ORAL DAILY
Qty: 30 TABLET | Refills: 2 | Status: SHIPPED | OUTPATIENT
Start: 2024-12-18

## 2024-12-18 RX ORDER — HYDROCHLOROTHIAZIDE 25 MG/1
25 TABLET ORAL DAILY
Qty: 90 TABLET | Refills: 1 | Status: SHIPPED | OUTPATIENT
Start: 2024-12-18

## 2024-12-18 RX ORDER — METFORMIN HYDROCHLORIDE 500 MG/1
500 TABLET, EXTENDED RELEASE ORAL EVERY 12 HOURS
Qty: 90 TABLET | Refills: 3 | Status: SHIPPED | OUTPATIENT
Start: 2024-12-18

## 2024-12-18 RX ORDER — ATORVASTATIN CALCIUM 10 MG/1
10 TABLET, FILM COATED ORAL DAILY
Qty: 90 TABLET | Refills: 1 | Status: SHIPPED | OUTPATIENT
Start: 2024-12-18

## 2024-12-18 RX ORDER — LOSARTAN POTASSIUM 50 MG/1
50 TABLET ORAL DAILY
Qty: 90 TABLET | Refills: 1 | Status: SHIPPED | OUTPATIENT
Start: 2024-12-18 | End: 2024-12-18

## 2024-12-18 NOTE — PROGRESS NOTES
"Chief Complaint  Hypertension (Medication changes-went for work physical yesterday and his bp was elevated)    Subjective      Mode Florez presents to [facility]  History of Present Illness  The patient is a 44-year-old male who is here today for a follow-up visit.    He has been experiencing elevated blood pressure, with a reading of 178/105 during his occupational screening yesterday. Despite resting, his blood pressure remained high at 168/90. This morning, his blood pressure was recorded as 148/86. He has been on a regimen of losartan 100 mg daily and hydrochlorothiazide 12.5 mg for approximately 2 weeks. He reports that his blood pressure tends to be higher when measured in the clinic setting. He has been advised to seek hospital care due to his elevated readings. He has been incorporating beet juice into his diet and has reduced his sodium intake. He reports no adverse effects from his current medications.    At his last appointment on 11/03/2024, his metformin was increased to 500 mg extended release twice daily. He is doing well on the metformin and needs a refill.    He is on atorvastatin 10 mg for his cholesterol management.    ALLERGIES  The patient has no known allergies.    MEDICATIONS  Current: Metformin, losartan, hydrochlorothiazide, atorvastatin      Objective   Vital Signs:  /86   Pulse 78   Temp 98.3 °F (36.8 °C)   Resp 16   Ht 182.9 cm (72.01\")   Wt 108 kg (238 lb)   SpO2 98%   BMI 32.27 kg/m²   Estimated body mass index is 32.27 kg/m² as calculated from the following:    Height as of this encounter: 182.9 cm (72.01\").    Weight as of this encounter: 108 kg (238 lb).            Physical Exam     Physical Exam  Vital Signs  Blood pressure reading was 148/86.      Result Review :  The following labs/imaging/notes were reviewed and discussed with the patient by Prasanna Cardona MD on 12/18/2024:   Latest Reference Range & Units 05/16/24 07:49 10/24/24 " 08:34   Sodium 136 - 145 mmol/L  140   Potassium 3.5 - 5.2 mmol/L  4.1   Chloride 98 - 107 mmol/L  100   CO2 22.0 - 29.0 mmol/L  28.8   BUN 6 - 20 mg/dL  14   Creatinine 0.76 - 1.27 mg/dL  1.10   BUN/Creatinine Ratio 7.0 - 25.0   12.7   EGFR Result >60.0 mL/min/1.73  84.9   Glucose 65 - 99 mg/dL  159 (H)   Calcium 8.6 - 10.5 mg/dL  8.9   Alkaline Phosphatase 39 - 117 U/L  64   Total Protein 6.0 - 8.5 g/dL  7.0   Albumin 3.5 - 5.2 g/dL  4.4   A/G Ratio g/dL  1.7   AST (SGOT) 1 - 40 U/L  37   ALT (SGPT) 1 - 41 U/L  70 (H)   Total Bilirubin 0.0 - 1.2 mg/dL  0.7   Hemoglobin A1C 4.80 - 5.60 % 6.70 (H) 7.40 (H)   Total Cholesterol 0 - 200 mg/dL  118   HDL Cholesterol 40 - 60 mg/dL  44   LDL Cholesterol  0 - 100 mg/dL  58   Triglycerides 0 - 150 mg/dL  83   VLDL Cholesterol Bharathi 5 - 40 mg/dL  16   Globulin gm/dL  2.6   Creatinine, Urine Not Estab. mg/dL  173.0   Microalbumin, Urine Not Estab. ug/mL  47.9   Microalbumin/Creatinine Ratio 0 - 29 mg/g creat  28   (H): Data is abnormally high          Assessment      Diagnoses and all orders for this visit:    1. Primary hypertension (Primary)    2. Mixed hyperlipidemia    3. Type 2 diabetes mellitus without complication, without long-term current use of insulin    Other orders  -     Discontinue: losartan (COZAAR) 50 MG tablet; Take 1 tablet by mouth Daily.  Dispense: 90 tablet; Refill: 1  -     amLODIPine (NORVASC) 5 MG tablet; Take 1 tablet by mouth Daily.  Dispense: 30 tablet; Refill: 2  -     losartan (Cozaar) 100 MG tablet; Take 1 tablet by mouth Daily.  Dispense: 90 tablet; Refill: 1  -     hydroCHLOROthiazide 25 MG tablet; Take 1 tablet by mouth Daily.  Dispense: 90 tablet; Refill: 1  -     metFORMIN ER (GLUCOPHAGE-XR) 500 MG 24 hr tablet; Take 1 tablet by mouth Every 12 (Twelve) Hours.  Dispense: 90 tablet; Refill: 3  -     atorvastatin (LIPITOR) 10 MG tablet; Take 1 tablet by mouth Daily.  Dispense: 90 tablet; Refill: 1             Assessment & Plan  1.  Hypertension.  His blood pressure remains elevated despite being on losartan 100 mg daily and hydrochlorothiazide 12.5 mg daily for about 2 weeks. Blood pressure readings have been 178/105, 168/90, and 148/86. He will discontinue Hyzaar and losartan 50 mg. He is prescribed losartan 100 mg, hydrochlorothiazide 25 mg, and amlodipine 5 mg daily. He is advised to take these medications in the morning and afternoon to manage his blood pressure effectively. A 90-day supply of these medications will be provided. He is also advised to continue monitoring his blood pressure at home. If there is no improvement in his blood pressure after 6 weeks on the new medication regimen, an ultrasound of his kidneys will be considered to check for renal artery stenosis.    2. Cholesterol management.  He will continue taking atorvastatin 10 mg daily. A 90-day supply of atorvastatin will be provided.    3. Diabetes management.  He will continue taking metformin 500 mg extended release twice daily. A 90-day supply of metformin will be provided.    Follow-up  The patient will follow up in 6 weeks.    No orders of the defined types were placed in this encounter.      Follow Up   Return in about 6 weeks (around 1/29/2025) for HTN w/ medication adjustments(3rd increase), DMII, HLD.  Patient was given instructions and counseling regarding his condition or for health maintenance advice. Please see specific information pulled into the AVS if appropriate.         Patient or patient representative verbalized consent for the use of Ambient Listening during the visit with  Prasanna Cardona MD for chart documentation. 12/23/2024  20:26 CST

## 2025-02-05 ENCOUNTER — OFFICE VISIT (OUTPATIENT)
Dept: FAMILY MEDICINE CLINIC | Facility: CLINIC | Age: 45
End: 2025-02-05
Payer: COMMERCIAL

## 2025-02-05 VITALS
WEIGHT: 239 LBS | BODY MASS INDEX: 32.37 KG/M2 | HEART RATE: 95 BPM | OXYGEN SATURATION: 97 % | HEIGHT: 72 IN | SYSTOLIC BLOOD PRESSURE: 124 MMHG | DIASTOLIC BLOOD PRESSURE: 78 MMHG

## 2025-02-05 DIAGNOSIS — I10 PRIMARY HYPERTENSION: Primary | ICD-10-CM

## 2025-02-05 DIAGNOSIS — E11.9 TYPE 2 DIABETES MELLITUS WITHOUT COMPLICATION, WITHOUT LONG-TERM CURRENT USE OF INSULIN: ICD-10-CM

## 2025-02-05 DIAGNOSIS — E78.2 MIXED HYPERLIPIDEMIA: ICD-10-CM

## 2025-02-05 PROCEDURE — 99214 OFFICE O/P EST MOD 30 MIN: CPT

## 2025-02-05 RX ORDER — AMLODIPINE BESYLATE 5 MG/1
5 TABLET ORAL DAILY
Qty: 90 TABLET | Refills: 1 | Status: SHIPPED | OUTPATIENT
Start: 2025-02-05

## 2025-02-05 RX ORDER — ATORVASTATIN CALCIUM 10 MG/1
10 TABLET, FILM COATED ORAL DAILY
Qty: 90 TABLET | Refills: 1 | Status: SHIPPED | OUTPATIENT
Start: 2025-02-05

## 2025-02-05 RX ORDER — METFORMIN HYDROCHLORIDE 500 MG/1
500 TABLET, EXTENDED RELEASE ORAL EVERY 12 HOURS
Qty: 90 TABLET | Refills: 3 | Status: SHIPPED | OUTPATIENT
Start: 2025-02-05 | End: 2025-02-09 | Stop reason: SDUPTHER

## 2025-02-05 RX ORDER — AMLODIPINE BESYLATE 5 MG/1
5 TABLET ORAL DAILY
Qty: 30 TABLET | Refills: 2 | Status: SHIPPED | OUTPATIENT
Start: 2025-02-05

## 2025-02-05 RX ORDER — METFORMIN HYDROCHLORIDE 500 MG/1
500 TABLET, EXTENDED RELEASE ORAL EVERY 12 HOURS
Qty: 90 TABLET | Refills: 1 | Status: SHIPPED | OUTPATIENT
Start: 2025-02-05 | End: 2025-02-09 | Stop reason: SDUPTHER

## 2025-02-05 RX ORDER — HYDROCHLOROTHIAZIDE 25 MG/1
25 TABLET ORAL DAILY
Qty: 90 TABLET | Refills: 1 | Status: SHIPPED | OUTPATIENT
Start: 2025-02-05

## 2025-02-05 RX ORDER — LOSARTAN POTASSIUM 100 MG/1
100 TABLET ORAL DAILY
Qty: 90 TABLET | Refills: 1 | Status: SHIPPED | OUTPATIENT
Start: 2025-02-05

## 2025-02-05 NOTE — PROGRESS NOTES
Chief Complaint  Hypertension and Med Refill (Medication is pending for you to sign)    Subjective      Mode Florez presents to [facility]  History of Present Illness  The patient is a 44-year-old male who presents for follow-up of hypertension, diabetes mellitus, and hyperlipidemia.    He reports an improvement in his blood pressure readings, which have been consistently in the 130s at home. However, he has not monitored his blood pressure in the past week. He has been adhering to his prescribed regimen of losartan 100 mg, hydrochlorothiazide 25 mg, and amlodipine 5 mg, all taken once daily. He expresses concern about the potential renal impact of these medications. He has exhausted his current supply of these medications and has not taken them for the past few days but has been compliant with the regimen until this point.    For his diabetes, he is currently on metformin 500 mg twice daily but admits to only taking one dose due to a dwindling supply. He has not had his A1c levels checked recently. He recounts an incident where he consumed a banana prior to a drug test, which resulted in elevated urine glucose levels and subsequent paperwork. 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 He has been making dietary modifications, including reducing fast food intake, increasing water and vegetable consumption, and decreasing soda consumption. He has also incorporated cranberry juice into his diet, despite its high sugar content.    His cholesterol levels were previously within normal limits. He is on atorvastatin 10 mg and reports no side effects. He is trying to watch what he eats and has been cutting back on fast food. He is also trying to cut back on sodas and juice drinks.    MEDICATIONS  Current: Losartan, hydrochlorothiazide, amlodipine, metformin, atorvastatin.      Objective   Vital  "Signs:  /78   Pulse 95   Ht 182.9 cm (72\")   Wt 108 kg (239 lb)   SpO2 97%   BMI 32.41 kg/m²   Estimated body mass index is 32.41 kg/m² as calculated from the following:    Height as of this encounter: 182.9 cm (72\").    Weight as of this encounter: 108 kg (239 lb).            Physical Exam     Physical Exam  Vital Signs  Blood pressure reading is 124/78.      Result Review :  The following labs/imaging/notes were reviewed and discussed with the patient by Prasanna Cardona MD on 02/05/2025:     Latest Reference Range & Units 10/24/24 08:34   Potassium 3.5 - 5.2 mmol/L 4.1   Chloride 98 - 107 mmol/L 100   CO2 22.0 - 29.0 mmol/L 28.8   BUN 6 - 20 mg/dL 14   Creatinine 0.76 - 1.27 mg/dL 1.10   BUN/Creatinine Ratio 7.0 - 25.0  12.7   EGFR Result >60.0 mL/min/1.73 84.9   Glucose 65 - 99 mg/dL 159 (H)   Calcium 8.6 - 10.5 mg/dL 8.9   Alkaline Phosphatase 39 - 117 U/L 64   Total Protein 6.0 - 8.5 g/dL 7.0   Albumin 3.5 - 5.2 g/dL 4.4   A/G Ratio g/dL 1.7   AST (SGOT) 1 - 40 U/L 37   ALT (SGPT) 1 - 41 U/L 70 (H)   Total Bilirubin 0.0 - 1.2 mg/dL 0.7   Hemoglobin A1C 4.80 - 5.60 % 7.40 (H)   Total Cholesterol 0 - 200 mg/dL 118   HDL Cholesterol 40 - 60 mg/dL 44   LDL Cholesterol  0 - 100 mg/dL 58   Triglycerides 0 - 150 mg/dL 83   VLDL Cholesterol Bharathi 5 - 40 mg/dL 16   Globulin gm/dL 2.6   (H): Data is abnormally high       Latest Reference Range & Units 04/04/24 10:34 10/24/24 08:34   WBC 3.40 - 10.80 10*3/mm3 6.16    RBC 4.14 - 5.80 10*6/mm3 5.93 (H)    Hemoglobin 13.0 - 17.7 g/dL 16.4    Hematocrit 37.5 - 51.0 % 48.3    Platelets 140 - 450 10*3/mm3 299    RDW 12.3 - 15.4 % 14.0    MCV 79.0 - 97.0 fL 81.5    MCH 26.6 - 33.0 pg 27.7    MCHC 31.5 - 35.7 g/dL 34.0    Neutrophil Rel % 42.7 - 76.0 % 51.3    Lymphocyte Rel % 19.6 - 45.3 % 37.8    Monocyte Rel % 5.0 - 12.0 % 8.1    Eosinophil Rel % 0.3 - 6.2 % 1.9    Basophil Rel % 0.0 - 1.5 % 0.6    Immature Granulocyte Rel % 0.0 - 0.5 % 0.3    Neutrophils Absolute 1.70 - " 7.00 10*3/mm3 3.15    Lymphocytes Absolute 0.70 - 3.10 10*3/mm3 2.33    Monocytes Absolute 0.10 - 0.90 10*3/mm3 0.50    Eosinophils Absolute 0.00 - 0.40 10*3/mm3 0.12    Basophils Absolute 0.00 - 0.20 10*3/mm3 0.04    Immature Grans, Absolute 0.00 - 0.05 10*3/mm3 0.02    nRBC 0.0 - 0.2 /100 WBC 0.0    Creatinine, Urine Not Estab. mg/dL  173.0   Microalbumin, Urine Not Estab. ug/mL  47.9   Microalbumin/Creatinine Ratio 0 - 29 mg/g creat  28   (H): Data is abnormally high     Latest Reference Range & Units 04/04/24 10:34 05/16/24 07:49 10/24/24 08:34   Hemoglobin A1C 4.80 - 5.60 % 6.90 (H) 6.70 (H) 7.40 (H)   (H): Data is abnormally high    Assessment      Diagnoses and all orders for this visit:    1. Primary hypertension (Primary)  -     Comprehensive Metabolic Panel  -     Vitamin B12 & Folate  -     Hemoglobin A1c    2. Mixed hyperlipidemia  -     Comprehensive Metabolic Panel  -     Vitamin B12 & Folate  -     Hemoglobin A1c    3. Type 2 diabetes mellitus without complication, without long-term current use of insulin  -     Comprehensive Metabolic Panel  -     Vitamin B12 & Folate  -     Hemoglobin A1c             Assessment & Plan  1. Hypertension.  His blood pressure is well-regulated with the current medication regimen of losartan 100 mg once daily, hydrochlorothiazide 25 mg once daily, and amlodipine 5 mg once daily. Today's blood pressure reading was 124/78. He will continue with the current medication regimen. A comprehensive metabolic panel (CMP) will be ordered to monitor kidney function due to the use of multiple antihypertensive medications.    2. Diabetes Mellitus.  His last recorded A1c level in October 2024 was 7.4. He is currently on metformin 500 mg twice daily. An A1c test will be conducted today to assess his current glycemic control. He is advised to continue taking metformin 500 mg twice daily. Dietary modifications, including reducing fast food and sugary drinks, are encouraged. If the A1c  level remains stable, no changes to the medication regimen will be made.    3. Hyperlipidemia.  His cholesterol levels are within the normal range. He will continue with the current atorvastatin 10 mg regimen. No side effects have been reported, and he is doing well on this medication.    Follow-up  The patient will follow up in 6 months.    Orders Placed This Encounter   Procedures    Comprehensive Metabolic Panel     Order Specific Question:   Release to patient     Answer:   Routine Release [[id number]]    Vitamin B12 & Folate     Order Specific Question:   Release to patient     Answer:   Routine Release [[id number]]    Hemoglobin A1c     Order Specific Question:   Release to patient     Answer:   Routine Release [[id number]]       Follow Up   Return in about 6 months (around 8/5/2025) for DMII, HTN, HLD, annual labs.  Patient was given instructions and counseling regarding his condition or for health maintenance advice. Please see specific information pulled into the AVS if appropriate.         Patient or patient representative verbalized consent for the use of Ambient Listening during the visit with  Prasanna Cardona MD for chart documentation. 2/5/2025  11:22 CST

## 2025-02-06 LAB
ALBUMIN SERPL-MCNC: 4.4 G/DL (ref 3.5–5.2)
ALBUMIN/GLOB SERPL: 1.5 G/DL
ALP SERPL-CCNC: 64 U/L (ref 39–117)
ALT SERPL-CCNC: 81 U/L (ref 1–41)
AST SERPL-CCNC: 41 U/L (ref 1–40)
BILIRUB SERPL-MCNC: 1 MG/DL (ref 0–1.2)
BUN SERPL-MCNC: 14 MG/DL (ref 6–20)
BUN/CREAT SERPL: 12.5 (ref 7–25)
CALCIUM SERPL-MCNC: 9.1 MG/DL (ref 8.6–10.5)
CHLORIDE SERPL-SCNC: 102 MMOL/L (ref 98–107)
CO2 SERPL-SCNC: 28 MMOL/L (ref 22–29)
CREAT SERPL-MCNC: 1.12 MG/DL (ref 0.76–1.27)
EGFRCR SERPLBLD CKD-EPI 2021: 83.1 ML/MIN/1.73
FOLATE SERPL-MCNC: 18.8 NG/ML (ref 4.78–24.2)
GLOBULIN SER CALC-MCNC: 3 GM/DL
GLUCOSE SERPL-MCNC: 159 MG/DL (ref 65–99)
HBA1C MFR BLD: 8.1 % (ref 4.8–5.6)
POTASSIUM SERPL-SCNC: 4.5 MMOL/L (ref 3.5–5.2)
PROT SERPL-MCNC: 7.4 G/DL (ref 6–8.5)
SODIUM SERPL-SCNC: 141 MMOL/L (ref 136–145)
VIT B12 SERPL-MCNC: 788 PG/ML (ref 211–946)

## 2025-02-09 RX ORDER — METFORMIN HYDROCHLORIDE 500 MG/1
1000 TABLET, EXTENDED RELEASE ORAL EVERY 12 HOURS
Qty: 180 TABLET | Refills: 3 | Status: SHIPPED | OUTPATIENT
Start: 2025-02-09

## 2025-08-08 ENCOUNTER — OFFICE VISIT (OUTPATIENT)
Dept: FAMILY MEDICINE CLINIC | Facility: CLINIC | Age: 45
End: 2025-08-08
Payer: COMMERCIAL

## 2025-08-08 VITALS
OXYGEN SATURATION: 96 % | BODY MASS INDEX: 30.61 KG/M2 | DIASTOLIC BLOOD PRESSURE: 84 MMHG | HEART RATE: 83 BPM | WEIGHT: 226 LBS | HEIGHT: 72 IN | SYSTOLIC BLOOD PRESSURE: 144 MMHG

## 2025-08-08 DIAGNOSIS — E11.9 TYPE 2 DIABETES MELLITUS WITHOUT COMPLICATION, WITHOUT LONG-TERM CURRENT USE OF INSULIN: Primary | ICD-10-CM

## 2025-08-08 LAB
EXPIRATION DATE: ABNORMAL
HBA1C MFR BLD: 11.3 % (ref 4.5–5.7)
Lab: ABNORMAL